# Patient Record
Sex: FEMALE | Race: WHITE
[De-identification: names, ages, dates, MRNs, and addresses within clinical notes are randomized per-mention and may not be internally consistent; named-entity substitution may affect disease eponyms.]

---

## 2018-01-10 ENCOUNTER — HOSPITAL ENCOUNTER (EMERGENCY)
Dept: HOSPITAL 89 - ER | Age: 48
Discharge: HOME | End: 2018-01-10
Payer: COMMERCIAL

## 2018-01-10 VITALS — BODY MASS INDEX: 38.21 KG/M2 | WEIGHT: 258 LBS | HEIGHT: 69 IN

## 2018-01-10 VITALS — DIASTOLIC BLOOD PRESSURE: 91 MMHG | SYSTOLIC BLOOD PRESSURE: 128 MMHG

## 2018-01-10 DIAGNOSIS — G89.29: Primary | ICD-10-CM

## 2018-01-10 LAB
INR PPP: 1.03
PLATELET COUNT, AUTOMATED: 201 K/UL (ref 150–450)

## 2018-01-10 PROCEDURE — 82435 ASSAY OF BLOOD CHLORIDE: CPT

## 2018-01-10 PROCEDURE — 96374 THER/PROPH/DIAG INJ IV PUSH: CPT

## 2018-01-10 PROCEDURE — 85730 THROMBOPLASTIN TIME PARTIAL: CPT

## 2018-01-10 PROCEDURE — 85025 COMPLETE CBC W/AUTO DIFF WBC: CPT

## 2018-01-10 PROCEDURE — 74018 RADEX ABDOMEN 1 VIEW: CPT

## 2018-01-10 PROCEDURE — 82040 ASSAY OF SERUM ALBUMIN: CPT

## 2018-01-10 PROCEDURE — 84450 TRANSFERASE (AST) (SGOT): CPT

## 2018-01-10 PROCEDURE — 84520 ASSAY OF UREA NITROGEN: CPT

## 2018-01-10 PROCEDURE — 74176 CT ABD & PELVIS W/O CONTRAST: CPT

## 2018-01-10 PROCEDURE — 82310 ASSAY OF CALCIUM: CPT

## 2018-01-10 PROCEDURE — 82565 ASSAY OF CREATININE: CPT

## 2018-01-10 PROCEDURE — 82247 BILIRUBIN TOTAL: CPT

## 2018-01-10 PROCEDURE — 81001 URINALYSIS AUTO W/SCOPE: CPT

## 2018-01-10 PROCEDURE — 84295 ASSAY OF SERUM SODIUM: CPT

## 2018-01-10 PROCEDURE — 84075 ASSAY ALKALINE PHOSPHATASE: CPT

## 2018-01-10 PROCEDURE — 82947 ASSAY GLUCOSE BLOOD QUANT: CPT

## 2018-01-10 PROCEDURE — 85610 PROTHROMBIN TIME: CPT

## 2018-01-10 PROCEDURE — 84132 ASSAY OF SERUM POTASSIUM: CPT

## 2018-01-10 PROCEDURE — 80320 DRUG SCREEN QUANTALCOHOLS: CPT

## 2018-01-10 PROCEDURE — 84155 ASSAY OF PROTEIN SERUM: CPT

## 2018-01-10 PROCEDURE — 99283 EMERGENCY DEPT VISIT LOW MDM: CPT

## 2018-01-10 PROCEDURE — 83690 ASSAY OF LIPASE: CPT

## 2018-01-10 PROCEDURE — 84460 ALANINE AMINO (ALT) (SGPT): CPT

## 2018-01-10 PROCEDURE — 82374 ASSAY BLOOD CARBON DIOXIDE: CPT

## 2018-01-10 NOTE — RADIOLOGY IMAGING REPORT
FACILITY: [facility] 

 

PATIENT NAME: Ana Maria Cole

: 1970

MR: [id number]

V: [id number]

EXAM DATE: 

ORDERING PHYSICIAN: MARIZA COTO

TECHNOLOGIST: 

 

Location: [facility]

Patient: Ana Maria Cole

: 1970

MRN: [id number]

Visit/Account:[id number]

Date of Sevice:  1/10/2018

 

ACCESSION #: [id number]

 

EXAMINATION: KUB 1/10/2018 9:16 AM

 

HISTORY: Abdominal pain. Constipation.

 

COMPARISON: None

 

FINDINGS:   Bowel gas pattern is unremarkable, without significant distention.  No substantial fecal 
retention. No visible calculus.  Soft tissue contours are unremarkable. Previous lumbosacral fusion.

 

IMPRESSION:

Nonspecific bowel gas pattern. No significant fecal retention.

 

Report Dictated By: Dorian Miller MD at 1/10/2018 9:37 AM

 

Report E-Signed By: Dorian Miller MD  at 1/10/2018 9:38 AM

 

WSN:[id number]

## 2018-01-10 NOTE — ER REPORT
History and Physical


Time Seen By MD:  08:45


Hx. of Stated Complaint:  


CONSTIPATION X 3 WEEKS


HPI/ROS


CHIEF COMPLAINT: Chronic constipation abdominal discomfort





HISTORY OF PRESENT ILLNESS: Patient is a 47-year-old female multiple surgical 

history including abdominal back surgery comes in with chronic constipation is 

been seen by primary care given numerous laxatives evidently she is on 

significant amounts of opioids for the last 10 years patient states that she's 

had some loose bowel movements last couple days but nothing regular and she's 

having abdominal discomfort. Patient has had nausea without vomiting denies any 

additional complaints at this time has no fever chills or sweats





REVIEW OF SYSTEMS:


Respiratory: No cough, no dyspnea.


Cardiovascular: No chest pain, no palpitations.


Gastrointestinal: Abdominal pain constipation nausea without vomiting


Musculoskeletal: Chronic back pain back pain.


Remainder of the 14 system rev:  Yes


Allergies:  


Coded Allergies:  


     iodine (Verified  Allergy, Unknown, 11/9/15)


     codeine (Verified  Adverse Reaction, Intermediate, NAUSEA AND VOMITING, 11/

9/15)


Uncoded Allergies:  


     CONTRAST (Allergy, Intermediate, HIVES, 1/10/18)


Home Meds


Active Scripts


Oxycodone Hcl/Acetaminophen (PERCOCET 5-325 MG TABLET) 1 Each Tablet, 1 EACH PO 

Q4-6H Y for PAIN, #15 TAB


   Prov:URSULA BLANC [facility]         11/9/15


Promethazine Hcl (PROMETHAZINE HCL) 25 Mg Tablet, 25 MG PO Q8H Y for NAUSEA/

VOMITING, #12 TAB


   Prov:URSULA BLANC [facility]         11/9/15


Trazodone Hcl (TRAZODONE HCL) 50 Mg Tablet, 50 MG PO BID, #60


   Prov:MARK NORWOOD MD         11/14/14


Reported Medications


Gabapentin (Neurontin) 600 Mg Tablet, 600 MG PO TID, 0 Refills


   7/21/11


Morphine Sulfate (Brenda) 30 Mg Cap.sr.pel, 90 MG PO QAM, 0 Refills


   7/21/11


Levothyroxine Sodium (Levothyroxine Sodium) 50 Mcg Tablet, 50 MCG PO QAM, 0 

Refills


   7/21/11


Metoprolol Tartrate (Lopressor) 50 Mg Tablet, 50 MG PO BID, 0 Refills


   7/21/11


Clonazepam (Klonopin) 1 Mg Tablet, 1 MG PO DAILY, 0 Refills


   7/21/11


Baclofen (Lioresal) 10 Mg Tab, 20 MG PO QID, 0 Refills


   7/21/11


Oxycodone Hcl/Acetaminophen (Percocet 10/325 Mg Tablet) 10 Mg/325 Mg Tab, 1 TAB 

PO PRN, 0 Refills


   7/21/11


Discontinued Reported Medications


Escitalopram Oxalate (LEXAPRO) 20 Mg Tablet, 20 MG PO QDAY


   11/9/15


Amlodipine Besylate (Norvasc) 2.5 Mg Tablet, 2.5 MG PO QHS, 0 Refills


   7/21/11


Discontinued Scripts


Prednisone (PREDNISONE) 20 Mg Tablet, 20 MG PO BID, #10 TAB


   Prov:URSULA BLANC FNP         11/9/15


Reviewed Nurses Notes:  Yes


Old Medical Records Reviewed:  Yes


Hx Smoking:  Yes


Smoking Status:  Current: Every Day Smoker


Hx Substance Use Disorder:  No


Hx Alcohol Use:  No


Constitutional





Vital Sign - Last 24 Hours








 1/10/18 1/10/18





 08:39 09:41


 


Temp 98.3 


 


Pulse 95 


 


Resp 16 


 


B/P (MAP) 128/91 


 


Pulse Ox 93 


 


O2 Delivery Room Air 


 


O2 Flow Rate  2.0








Physical Exam


  General Appearance: [The patient is alert, has no immediate need for airway 

protection and no current signs of toxicity.]  [ ]


Eyes: Pupils equal and round no injection.


Respiratory: Chest is non tender, lungs are clear to auscultation.


Cardiac: regular rate and rhythm [ ]


Gastrointestinal: Hyperactive bowel sounds tender to palpation in all quadrants 

no rebound guarding or masses


Musculoskeletal:  Neck: Neck is supple and non tender.


   Extremities have full range of motion and are non tender.


Skin: No rashes or lesions.


[ ]


DIFFERENTIAL DIAGNOSIS: After history and physical exam differential diagnosis 

was considered for chronic constipation secondary to opioid use small bowel 

obstruction colitis





Medical Decision Making


Data Points


Result Diagram:  


1/10/18 0902                                                                   

             1/10/18 0902





Laboratory





Hematology








Test


  1/10/18


09:02 1/10/18


09:29


 


Red Blood Count


  5.56 M/uL


(4.17-5.56) 


 


 


Mean Corpuscular Volume


  88.5 fL


(80.0-96.0) 


 


 


Mean Corpuscular Hemoglobin


  31.2 pg


(26.0-33.0) 


 


 


Mean Corpuscular Hemoglobin


Concent 35.2 g/dL


(32.0-36.0) 


 


 


Red Cell Distribution Width


  13.7 %


(11.5-14.5) 


 


 


Mean Platelet Volume


  8.8 fL


(7.2-11.1) 


 


 


Neutrophils (%) (Auto)


  46.9 %


(39.4-72.5) 


 


 


Lymphocytes (%) (Auto)


  44.7 %


(17.6-49.6) 


 


 


Monocytes (%) (Auto)


  6.1 %


(4.1-12.4) 


 


 


Eosinophils (%) (Auto)


  1.7 %


(0.4-6.7) 


 


 


Basophils (%) (Auto)


  0.6 %


(0.3-1.4) 


 


 


Nucleated RBC Relative Count


(auto) 0.1 /100WBC 


  


 


 


Neutrophils # (Auto)


  4.4 K/uL


(2.0-7.4) 


 


 


Lymphocytes # (Auto)


  4.2 K/uL


(1.3-3.6) 


 


 


Monocytes # (Auto)


  0.6 K/uL


(0.3-1.0) 


 


 


Eosinophils # (Auto)


  0.2 K/uL


(0.0-0.5) 


 


 


Basophils # (Auto)


  0.1 K/uL


(0.0-0.1) 


 


 


Nucleated RBC Absolute Count


(auto) 0.01 K/uL 


  


 


 


Prothrombin Time


  13.5 seconds


(12.0-14.4) 


 


 


Prothromb Time International


Ratio 1.03 


  


 


 


Activated Partial


Thromboplast Time 33 seconds


(23-35) 


 


 


Sodium Level


  137 mmol/L


(137-145) 


 


 


Potassium Level


  3.9 mmol/L


(3.5-5.0) 


 


 


Chloride Level


  102 mmol/L


() 


 


 


Carbon Dioxide Level


  21 mmol/L


(22-31) 


 


 


Blood Urea Nitrogen


  15 mg/dl


(7-18) 


 


 


Creatinine


  0.80 mg/dl


(0.52-1.04) 


 


 


Glomerular Filtration Rate


Calc > 60.0 


  


 


 


Random Glucose


  106 mg/dl


() 


 


 


Calcium Level


  9.4 mg/dl


(8.4-10.2) 


 


 


Total Bilirubin


  0.6 mg/dl


(0.2-1.3) 


 


 


Aspartate Amino Transf


(AST/SGOT) 34 U/L (0-35) 


  


 


 


Alanine Aminotransferase


(ALT/SGPT) 49 U/L (0-56) 


  


 


 


Alkaline Phosphatase 67 U/L (0-126)  


 


Total Protein


  8.3 gm/dl


(6.3-8.2) 


 


 


Albumin


  4.6 g/dl


(3.5-5.0) 


 


 


Lipase


  61 U/L


() 


 


 


Serum Alcohol < 10 mg/dl  


 


Urine Color  Yellow 


 


Urine Clarity  Clear 


 


Urine pH


  


  5.0 pH


(4.8-9.5)


 


Urine Specific Gravity  1.006 


 


Urine Protein


  


  Negative mg/dL


(NEGATIVE)


 


Urine Glucose (UA)


  


  Negative mg/dL


(NEGATIVE)


 


Urine Ketones


  


  Negative mg/dL


(NEGATIVE)


 


Urine Blood


  


  Negative


(NEGATIVE)


 


Urine Nitrite


  


  Negative


(NEGATIVE)


 


Urine Bilirubin


  


  Negative


(NEGATIVE)


 


Urine Urobilinogen


  


  Negative mg/dL


(0.2-1.9)


 


Urine Leukocyte Esterase


  


  Negative


(NEGATIVE)


 


Urine RBC


  


  <1 /HPF


(0-2/HPF)


 


Urine WBC


  


  1 /HPF


(0-5/HPF)


 


Urine Squamous Epithelial


Cells 


  Many /LPF


(</=FEW)


 


Urine Bacteria


  


  Few /HPF


(NONE-FEW)


 


Urine Mucus


  


  None /HPF


(NONE-FEW)








Chemistry








Test


  1/10/18


09:02 1/10/18


09:29


 


White Blood Count


  9.3 k/uL


(4.5-11.0) 


 


 


Red Blood Count


  5.56 M/uL


(4.17-5.56) 


 


 


Hemoglobin


  17.3 g/dL


(12.0-16.0) 


 


 


Hematocrit


  49.2 %


(34.0-47.0) 


 


 


Mean Corpuscular Volume


  88.5 fL


(80.0-96.0) 


 


 


Mean Corpuscular Hemoglobin


  31.2 pg


(26.0-33.0) 


 


 


Mean Corpuscular Hemoglobin


Concent 35.2 g/dL


(32.0-36.0) 


 


 


Red Cell Distribution Width


  13.7 %


(11.5-14.5) 


 


 


Platelet Count


  201 K/uL


(150-450) 


 


 


Mean Platelet Volume


  8.8 fL


(7.2-11.1) 


 


 


Neutrophils (%) (Auto)


  46.9 %


(39.4-72.5) 


 


 


Lymphocytes (%) (Auto)


  44.7 %


(17.6-49.6) 


 


 


Monocytes (%) (Auto)


  6.1 %


(4.1-12.4) 


 


 


Eosinophils (%) (Auto)


  1.7 %


(0.4-6.7) 


 


 


Basophils (%) (Auto)


  0.6 %


(0.3-1.4) 


 


 


Nucleated RBC Relative Count


(auto) 0.1 /100WBC 


  


 


 


Neutrophils # (Auto)


  4.4 K/uL


(2.0-7.4) 


 


 


Lymphocytes # (Auto)


  4.2 K/uL


(1.3-3.6) 


 


 


Monocytes # (Auto)


  0.6 K/uL


(0.3-1.0) 


 


 


Eosinophils # (Auto)


  0.2 K/uL


(0.0-0.5) 


 


 


Basophils # (Auto)


  0.1 K/uL


(0.0-0.1) 


 


 


Nucleated RBC Absolute Count


(auto) 0.01 K/uL 


  


 


 


Prothrombin Time


  13.5 seconds


(12.0-14.4) 


 


 


Prothromb Time International


Ratio 1.03 


  


 


 


Activated Partial


Thromboplast Time 33 seconds


(23-35) 


 


 


Glomerular Filtration Rate


Calc > 60.0 


  


 


 


Calcium Level


  9.4 mg/dl


(8.4-10.2) 


 


 


Total Bilirubin


  0.6 mg/dl


(0.2-1.3) 


 


 


Aspartate Amino Transf


(AST/SGOT) 34 U/L (0-35) 


  


 


 


Alanine Aminotransferase


(ALT/SGPT) 49 U/L (0-56) 


  


 


 


Alkaline Phosphatase 67 U/L (0-126)  


 


Total Protein


  8.3 gm/dl


(6.3-8.2) 


 


 


Albumin


  4.6 g/dl


(3.5-5.0) 


 


 


Lipase


  61 U/L


() 


 


 


Serum Alcohol < 10 mg/dl  


 


Urine Color  Yellow 


 


Urine Clarity  Clear 


 


Urine pH


  


  5.0 pH


(4.8-9.5)


 


Urine Specific Gravity  1.006 


 


Urine Protein


  


  Negative mg/dL


(NEGATIVE)


 


Urine Glucose (UA)


  


  Negative mg/dL


(NEGATIVE)


 


Urine Ketones


  


  Negative mg/dL


(NEGATIVE)


 


Urine Blood


  


  Negative


(NEGATIVE)


 


Urine Nitrite


  


  Negative


(NEGATIVE)


 


Urine Bilirubin


  


  Negative


(NEGATIVE)


 


Urine Urobilinogen


  


  Negative mg/dL


(0.2-1.9)


 


Urine Leukocyte Esterase


  


  Negative


(NEGATIVE)


 


Urine RBC


  


  <1 /HPF


(0-2/HPF)


 


Urine WBC


  


  1 /HPF


(0-5/HPF)


 


Urine Squamous Epithelial


Cells 


  Many /LPF


(</=FEW)


 


Urine Bacteria


  


  Few /HPF


(NONE-FEW)


 


Urine Mucus


  


  None /HPF


(NONE-FEW)








Coagulation








Test


  1/10/18


09:02


 


Prothrombin Time 13.5 seconds 


 


Prothromb Time International


Ratio 1.03 


 


 


Activated Partial


Thromboplast Time 33 seconds 


 








Toxicology








Test


  1/10/18


09:02


 


Serum Alcohol < 10 mg/dl 








Urinalysis








Test


  1/10/18


09:29


 


Urine Color Yellow 


 


Urine Clarity Clear 


 


Urine pH


  5.0 pH


(4.8-9.5)


 


Urine Specific Gravity 1.006 


 


Urine Protein


  Negative mg/dL


(NEGATIVE)


 


Urine Glucose (UA)


  Negative mg/dL


(NEGATIVE)


 


Urine Ketones


  Negative mg/dL


(NEGATIVE)


 


Urine Blood


  Negative


(NEGATIVE)


 


Urine Nitrite


  Negative


(NEGATIVE)


 


Urine Bilirubin


  Negative


(NEGATIVE)


 


Urine Urobilinogen


  Negative mg/dL


(0.2-1.9)


 


Urine Leukocyte Esterase


  Negative


(NEGATIVE)


 


Urine RBC


  <1 /HPF


(0-2/HPF)


 


Urine WBC


  1 /HPF


(0-5/HPF)


 


Urine Squamous Epithelial


Cells Many /LPF


(</=FEW)


 


Urine Bacteria


  Few /HPF


(NONE-FEW)


 


Urine Mucus


  None /HPF


(NONE-FEW)











ED Course/Re-evaluation


ED Course


ED clinical course 47-year-old female presented emergency Department today with 

complaint of constipation she is on chronic opioid significant doses on a daily 

basis for the past 10 years x-ray KUB as well as CAT scan performed showed no 

obvious obstruction SBO no sign of constipation patient electronic to otherwise 

unremarkable patient will be discharge diagnosis opioid-induced slow motility 

and constipation we'll start her on Moban taken primary care follow-up


Decision to Disposition Date:  [place] 10, 2018


Decision to Disposition Time:  10:25





Depart


Departure


Latest Vital Signs





Vital Signs








  Date Time  Temp Pulse Resp B/P (MAP) Pulse Ox O2 Delivery O2 Flow Rate FiO2


 


1/10/18 09:41       2.0 


 


1/10/18 08:39 98.3 95 16 128/91 93 Room Air  








Impression:  


 Primary Impression:  


 Chronic pain


Condition:  Improved


Disposition:  HOME OR SELF-CARE


Referrals:  


OMEGA XIAO DO


5 Days


Patient Instructions:  Abdominal Pain (ED)











MARIZA COTO MD Dell 10, 2018 09:21

## 2018-01-10 NOTE — RADIOLOGY IMAGING REPORT
FACILITY: [facility] 

 

PATIENT NAME: Ana Maria Cole

: 1970

MR: [id number]

V: [id number]

EXAM DATE: 

ORDERING PHYSICIAN: MARIZA COTO

TECHNOLOGIST: 

 

Location: [facility]

Patient: Ana Marai Cole

: 1970

MRN: [id number]

Visit/Account:[id number]

Date of Sevice:  1/10/2018

 

ACCESSION #: [id number]

 

CT abdomen and pelvis without contrast

 

Indication: Abdominal pain. Possible bowel obstruction.

 

Comparison: Plain film exam from earlier today was reviewed.

 

Technique: Axial CT images are obtained through the abdomen and pelvis. Reformatted coronal and sagit
clarisse images were reviewed. IV contrast was not administered.

 

One of the following dose optimization techniques was utilized in the performance of this exam: Autom
ated exposure control; adjustment of the mA and/or kV according to the patient's size; or use of an i
terative  reconstruction technique.  Specific details can be referenced in the facility's radiology C
T exam operational policy.

 

Findings:

Lower lung fields: Minimal atelectasis/peripheral scarring seen in the right lower lobe. Lung bases o
therwise clear.

 

Evaluation of the solid organs of the abdomen is limited without IV contrast.

 

Liver: No focal parenchymal abnormality of the liver.

Biliary: Gallbladder is partially contracted. No biliary dilatation.

Pancreas: Normal appearance.

Spleen: Normal appearance.s

Adrenal glands: Unremarkable.

Kidneys / retroperitoneum: No evidence of nephrolithiasis or hydronephrosis

 

 

Bowel / peritoneum / mesenteries: The colon is unremarkable and is partially stool and gas filled. No
 wall thickening or pericolonic inflammation. Appendix not clearly seen. No CT evidence of appendicit
is. No focally dilated small bowel loops. No evidence to suggest bowel obstruction. No free air. No f
ree pelvic fluid.

 

 

Lymph node assessment: No pathologic adenopathy identified.

 

Pelvic  structures: There has been previous hysterectomy. No free fluid. Cyst and/or dominant folli
dolores seen within the left ovary measuring 1.8 cm in size. This may be physiologic in a premenopausal f
emale. Clinical correlation necessary.

 

 

Vessels: No significant atherosclerotic calcifications seen throughout a nonaneurysmal abdominal aort
a and branches.

 

Musculoskeletal / Body wall: There has been prior anterior and posterior fusion procedure of the low 
lumbar spine extending from L4 to S1. Laminectomy defects are also seen. No compression fracture.

 

 

 

IMPRESSION:

1. No acute inflammatory process within the abdomen and the pelvis. No evidence of small bowel obstru
ction.

2. Cyst or follicle involving the left ovary measuring 1.8 cm.

3. Postoperative changes involving the low lumbar spine.

 

 

Report Dictated By: Giancarlo Lozano at 1/10/2018 9:56 AM

 

Report E-Signed By: Giancralo Lozano  at 1/10/2018 10:14 AM

 

WSN:[id number]

## 2018-03-09 ENCOUNTER — HOSPITAL ENCOUNTER (EMERGENCY)
Dept: HOSPITAL 89 - ER | Age: 48
Discharge: HOME | End: 2018-03-09
Payer: COMMERCIAL

## 2018-03-09 VITALS — DIASTOLIC BLOOD PRESSURE: 62 MMHG | SYSTOLIC BLOOD PRESSURE: 105 MMHG

## 2018-03-09 DIAGNOSIS — M25.562: Primary | ICD-10-CM

## 2018-03-09 PROCEDURE — 99283 EMERGENCY DEPT VISIT LOW MDM: CPT

## 2018-03-09 PROCEDURE — 73562 X-RAY EXAM OF KNEE 3: CPT
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
## 2018-10-15 NOTE — RADIOLOGY IMAGING REPORT
FACILITY: [facility] 

 

PATIENT NAME: Ana Maria Cole

: 1970

MR: [id number]

V: [id number]

EXAM DATE: 

ORDERING PHYSICIAN: MARIZA COTO

TECHNOLOGIST: 

 

Location: [facility]

Patient: Ana Maria Cole

: 1970

MRN: [id number]

Visit/Account:[id number]

Date of Sevice:  3/09/2018

 

ACCESSION #: [id number]

 

Exam type: KNEE 3 VIEW LEFT

 

History: Left knee pain

 

Comparison: None.

 

Findings:

 

There is mild narrowing of the medial compartment of the left knee with very mild marginal spurring. 
 There also appears to be spurring at the tibial plateau along the anterior medial surface.  Mild to 
moderate joint changes are also noted patellofemoral joint.  There suggestion of a small amount of fl
uid in the suprapatellar bursa.  No evidence of acute fracture or dislocation.

 

IMPRESSION:

 

1.  Mild degenerative changes involving the medial compartment of the left knee and mild to moderate 
joint changes involving the patellofemoral compartment although no evidence of acute fracture disloca
tion

 

Suggestion of small effusion in the suprapatellar bursa

 

Report Dictated By: Teodora Bravo MD at 3/9/2018 11:01 AM

 

Report E-Signed By: Teodora Bravo MD  at 3/9/2018 11:03 AM

 

WSN:[id number] no

## 2018-12-15 ENCOUNTER — HOSPITAL ENCOUNTER (EMERGENCY)
Dept: HOSPITAL 89 - ER | Age: 48
Discharge: HOME | End: 2018-12-15
Payer: COMMERCIAL

## 2018-12-15 VITALS — DIASTOLIC BLOOD PRESSURE: 119 MMHG | SYSTOLIC BLOOD PRESSURE: 184 MMHG

## 2018-12-15 DIAGNOSIS — F11.23: Primary | ICD-10-CM

## 2018-12-15 LAB — PLATELET COUNT, AUTOMATED: 211 K/UL (ref 150–450)

## 2018-12-15 PROCEDURE — 84075 ASSAY ALKALINE PHOSPHATASE: CPT

## 2018-12-15 PROCEDURE — 80320 DRUG SCREEN QUANTALCOHOLS: CPT

## 2018-12-15 PROCEDURE — 80329 ANALGESICS NON-OPIOID 1 OR 2: CPT

## 2018-12-15 PROCEDURE — 84460 ALANINE AMINO (ALT) (SGPT): CPT

## 2018-12-15 PROCEDURE — 82310 ASSAY OF CALCIUM: CPT

## 2018-12-15 PROCEDURE — 80305 DRUG TEST PRSMV DIR OPT OBS: CPT

## 2018-12-15 PROCEDURE — 85025 COMPLETE CBC W/AUTO DIFF WBC: CPT

## 2018-12-15 PROCEDURE — 83735 ASSAY OF MAGNESIUM: CPT

## 2018-12-15 PROCEDURE — 36415 COLL VENOUS BLD VENIPUNCTURE: CPT

## 2018-12-15 PROCEDURE — 81001 URINALYSIS AUTO W/SCOPE: CPT

## 2018-12-15 PROCEDURE — 84520 ASSAY OF UREA NITROGEN: CPT

## 2018-12-15 PROCEDURE — 84295 ASSAY OF SERUM SODIUM: CPT

## 2018-12-15 PROCEDURE — 84155 ASSAY OF PROTEIN SERUM: CPT

## 2018-12-15 PROCEDURE — 82435 ASSAY OF BLOOD CHLORIDE: CPT

## 2018-12-15 PROCEDURE — 82247 BILIRUBIN TOTAL: CPT

## 2018-12-15 PROCEDURE — 84443 ASSAY THYROID STIM HORMONE: CPT

## 2018-12-15 PROCEDURE — 84450 TRANSFERASE (AST) (SGOT): CPT

## 2018-12-15 PROCEDURE — 82374 ASSAY BLOOD CARBON DIOXIDE: CPT

## 2018-12-15 PROCEDURE — 99283 EMERGENCY DEPT VISIT LOW MDM: CPT

## 2018-12-15 PROCEDURE — 82947 ASSAY GLUCOSE BLOOD QUANT: CPT

## 2018-12-15 PROCEDURE — 82040 ASSAY OF SERUM ALBUMIN: CPT

## 2018-12-15 PROCEDURE — 81025 URINE PREGNANCY TEST: CPT

## 2018-12-15 PROCEDURE — 82565 ASSAY OF CREATININE: CPT

## 2018-12-15 PROCEDURE — 84132 ASSAY OF SERUM POTASSIUM: CPT

## 2018-12-15 NOTE — ER REPORT
History and Physical


Time Seen By MD:  11:03


Hx. of Stated Complaint:  


PATIENT REPORTS THAT SHE WANTS TO DETOX FROM MORPHINE AND PERCOCET


HPI/ROS


CHIEF COMPLAINT: Detox from opiates





HISTORY OF PRESENT ILLNESS: This is a 48-year-old female who presents to the 


emergency department lying to detox from her opiates. The patient states that 


she has spondylolisthesis and has had back surgeries and most recently had a 


left total knee replacement. Patient states that she's been on narcotic pain 


medications for the last 15 years, she states that she is tired of feeling 


foggy, sleepy and "just out of it". Patient is requesting detox from her 


prescription opiate medications. The last dose taken was yesterday morning, she 


typically takes her medications on a daily basis. She states that she is feeling


mildly anxious at this time. No fevers or chills. Mild intermittent nausea, no 


vomiting. No diarrhea. No headaches. No rashes.





REVIEW OF SYSTEMS:


Constitutional: No fever, no chills.


Eyes: No discharge.


ENT: No sore throat. 


Cardiovascular: No chest pain, no palpitations.


Respiratory: No cough, no shortness of breath.


Gastrointestinal: As above.


Genitourinary: No hematuria.


Musculoskeletal: No back pain.


Skin: No rashes.


Neurological: No headache.


Psychological: As above.


Allergies:  


Coded Allergies:  


     iodine (Verified  Allergy, Unknown, 3/9/18)


     codeine (Verified  Adverse Reaction, Intermediate, NAUSEA AND VOMITING, 


3/9/18)


Uncoded Allergies:  


     CONTRAST (Allergy, Intermediate, HIVES, 1/10/18)


Home Meds


Active Scripts


Clonazepam (KLONOPIN) 1 Mg Tablet, 1 MG PO BID, #20 TAB


   Prov:PAIGE BRONSON [facility]         12/15/18


Clonidine Hcl (CLONIDINE HCL) 0.1 Mg Tablet, 0.1 MG PO BID, #20 TAB 0 Refills


   Prov:PAIGE BRONSON [facility]         12/15/18


Promethazine Hcl (PROMETHAZINE HCL) 25 Mg Tablet, 25 MG PO Q8H PRN for 


NAUSEA/VOMITING, #12 TAB


   Prov:URSULA BLANC [facility]         11/9/15


Trazodone Hcl (TRAZODONE HCL) 50 Mg Tablet, 50 MG PO BID, #60


   Prov:MARK NORWOOD MD         11/14/14


Reported Medications


Omeprazole Magnesium (PRILOSEC OTC) 20 Mg Tablet.dr, 2 TAB PO QDAY, TAB


   12/15/18


Escitalopram Oxalate (LEXAPRO) 20 Mg Tablet, 20 MG PO QDAY, TAB


   12/15/18


Meloxicam (MELOXICAM) 7.5 Mg Tablet, 5 MG PO QDAY


   12/15/18


Gabapentin (Neurontin) 600 Mg Tablet, 600 MG PO TID, 0 Refills


   7/21/11


Morphine Sulfate (Brenda) 30 Mg Cap.sr.pel, 90 MG PO QAM, 0 Refills


   7/21/11


Levothyroxine Sodium (Levothyroxine Sodium) 50 Mcg Tablet, 75 MCG PO QAM, 0 


Refills


   7/21/11


Metoprolol Tartrate (Lopressor) 50 Mg Tablet, 50 MG PO BID, 0 Refills


   7/21/11


Clonazepam (Klonopin) 1 Mg Tablet, 1 MG PO DAILY, 0 Refills


   7/21/11


Baclofen (Lioresal) 10 Mg Tab, 20 MG PO QID, 0 Refills


   7/21/11


Oxycodone Hcl/Acetaminophen (Percocet 10/325 Mg Tablet) 10 Mg/325 Mg Tab, 1 TAB 


PO PRN, 0 Refills


   7/21/11


Past Medical/Surgical History


The patient has a past medical and surgical history of hypertension, acid 


reflux, chronic back pain, spinal listhesis, arthritis, hypothyroidism, anxiety,


panic attacks, depression, right knee surgery. Tonsillectomy.


Reviewed Nurses Notes:  Yes


Hx Smoking:  Yes


Smoking Status:  Current: Every Day Smoker


Hx Substance Use Disorder:  No


Hx Alcohol Use:  No


Constitutional





Vital Sign - Last 24 Hours








 12/15/18





 11:00


 


Temp 97.9


 


Pulse 81


 


Resp 20


 


B/P (MAP) 184/119


 


Pulse Ox 95


 


O2 Delivery Room Air








Physical Exam


   General Appearance: The patient is alert, has no immediate need for airway 


protection and no signs of toxicity. 


Eyes: Pupils equal and round no pallor or injection.


ENT, Mouth: Mucous membranes are dry, geographic tongue.


Respiratory: There are no retractions, lungs are clear to auscultation.


Cardiovascular: Regular rate and rhythm, no murmurs, clicks or rubs.


Gastrointestinal:  Abdomen is soft and non tender, no masses, bowel sounds 


normal.


Neurological: Alert and oriented 4. Moving all extremities. Following all 


commands. No focal neuro deficits.


Skin: Warm and dry, no rashes.


Musculoskeletal:  Neck is supple non tender.


      Extremities are nontender, nonswollen and have full range of motion.





DIFFERENTIAL DIAGNOSIS: After history and physical exam differential diagnosis 


was considered for seizure, hypertension and anxiety.





Medical Decision Making


Data Points


Result Diagram:  


12/15/18 1132                                                                   


            12/15/18 1132





Laboratory





Hematology








Test


 12/15/18


11:00 12/15/18


11:32


 


Urine Color Straw  


 


Urine Clarity Clear  


 


Urine pH


 5.0 pH


(4.8-9.5) 





 


Urine Specific Gravity 1.005  


 


Urine Protein


 Negative mg/dL


(NEGATIVE) 





 


Urine Glucose (UA)


 Negative mg/dL


(NEGATIVE) 





 


Urine Ketones


 Negative mg/dL


(NEGATIVE) 





 


Urine Blood


 Negative


(NEGATIVE) 





 


Urine Nitrite


 Negative


(NEGATIVE) 





 


Urine Bilirubin


 Negative


(NEGATIVE) 





 


Urine Urobilinogen


 Negative mg/dL


(0.2-1.9) 





 


Urine Leukocyte Esterase


 Negative


(NEGATIVE) 





 


Urine RBC


 <1 /HPF


(0-2/HPF) 





 


Urine WBC


 1 /HPF


(0-5/HPF) 





 


Urine Squamous Epithelial


Cells Many /LPF


(</=FEW) 





 


Urine Bacteria


 Negative /HPF


(NONE-FEW) 





 


Urine Mucus


 None /HPF


(NONE-FEW) 





 


Urine HCG, Qualitative


 Negative


(NEGATIVE) 





 


Urine Opiates Screen Positive  


 


Urine Barbiturates Screen Negative  


 


Ur Tricyclic Antidepressants


Screen Negative 


 





 


Urine Phencyclidine Screen Negative  


 


Urine Amphetamines Screen Negative  


 


Urine Benzodiazepines Screen Negative  


 


Urine Cocaine Screen Negative  


 


Urine Cannabinoids Screen Negative  


 


Red Blood Count


 


 5.13 M/uL


(4.17-5.56)


 


Mean Corpuscular Volume


 


 86.1 fL


(80.0-96.0)


 


Mean Corpuscular Hemoglobin


 


 29.8 pg


(26.0-33.0)


 


Mean Corpuscular Hemoglobin


Concent 


 34.6 g/dL


(32.0-36.0)


 


Red Cell Distribution Width


 


 14.4 %


(11.5-14.5)


 


Mean Platelet Volume


 


 8.1 fL


(7.2-11.1)


 


Neutrophils (%) (Auto)


 


 73.5 %


(39.4-72.5)


 


Lymphocytes (%) (Auto)


 


 21.3 %


(17.6-49.6)


 


Monocytes (%) (Auto)


 


 3.5 %


(4.1-12.4)


 


Eosinophils (%) (Auto)


 


 0.2 %


(0.4-6.7)


 


Basophils (%) (Auto)


 


 1.5 %


(0.3-1.4)


 


Nucleated RBC Relative Count


(auto) 


 0.1 /100WBC 





 


Neutrophils # (Auto)


 


 5.8 K/uL


(2.0-7.4)


 


Lymphocytes # (Auto)


 


 1.7 K/uL


(1.3-3.6)


 


Monocytes # (Auto)


 


 0.3 K/uL


(0.3-1.0)


 


Eosinophils # (Auto)


 


 0.0 K/uL


(0.0-0.5)


 


Basophils # (Auto)


 


 0.1 K/uL


(0.0-0.1)


 


Nucleated RBC Absolute Count


(auto) 


 0.01 K/uL 





 


Sodium Level


 


 136 mmol/L


(137-145)


 


Potassium Level


 


 3.8 mmol/L


(3.5-5.0)


 


Chloride Level


 


 104 mmol/L


()


 


Carbon Dioxide Level


 


 21 mmol/L


(22-31)


 


Blood Urea Nitrogen


 


 11 mg/dl


(7-18)


 


Creatinine


 


 0.80 mg/dl


(0.52-1.04)


 


Glomerular Filtration Rate


Calc 


 > 60.0 





 


Random Glucose


 


 142 mg/dl


()


 


Calcium Level


 


 9.5 mg/dl


(8.4-10.2)


 


Magnesium Level


 


 1.9 mg/dl


(1.7-2.2)


 


Total Bilirubin


 


 0.4 mg/dl


(0.2-1.3)


 


Aspartate Amino Transf


(AST/SGOT) 


 23 U/L (0-35) 





 


Alanine Aminotransferase


(ALT/SGPT) 


 27 U/L (0-56) 





 


Alkaline Phosphatase  63 U/L (0-126) 


 


Total Protein


 


 7.9 g/dl


(6.3-8.2)


 


Albumin


 


 4.5 g/dl


(3.5-5.0)


 


Salicylates Level  < 10 mg/L 


 


Salicylate Last Dose Date  unk 


 


Acetaminophen Level  < 10 ug/ml 


 


Serum Alcohol  < 10 mg/dl 








Chemistry








Test


 12/15/18


11:00 12/15/18


11:32


 


Urine Color Straw  


 


Urine Clarity Clear  


 


Urine pH


 5.0 pH


(4.8-9.5) 





 


Urine Specific Gravity 1.005  


 


Urine Protein


 Negative mg/dL


(NEGATIVE) 





 


Urine Glucose (UA)


 Negative mg/dL


(NEGATIVE) 





 


Urine Ketones


 Negative mg/dL


(NEGATIVE) 





 


Urine Blood


 Negative


(NEGATIVE) 





 


Urine Nitrite


 Negative


(NEGATIVE) 





 


Urine Bilirubin


 Negative


(NEGATIVE) 





 


Urine Urobilinogen


 Negative mg/dL


(0.2-1.9) 





 


Urine Leukocyte Esterase


 Negative


(NEGATIVE) 





 


Urine RBC


 <1 /HPF


(0-2/HPF) 





 


Urine WBC


 1 /HPF


(0-5/HPF) 





 


Urine Squamous Epithelial


Cells Many /LPF


(</=FEW) 





 


Urine Bacteria


 Negative /HPF


(NONE-FEW) 





 


Urine Mucus


 None /HPF


(NONE-FEW) 





 


Urine HCG, Qualitative


 Negative


(NEGATIVE) 





 


Urine Opiates Screen Positive  


 


Urine Barbiturates Screen Negative  


 


Ur Tricyclic Antidepressants


Screen Negative 


 





 


Urine Phencyclidine Screen Negative  


 


Urine Amphetamines Screen Negative  


 


Urine Benzodiazepines Screen Negative  


 


Urine Cocaine Screen Negative  


 


Urine Cannabinoids Screen Negative  


 


White Blood Count


 


 7.9 k/uL


(4.5-11.0)


 


Red Blood Count


 


 5.13 M/uL


(4.17-5.56)


 


Hemoglobin


 


 15.3 g/dL


(12.0-16.0)


 


Hematocrit


 


 44.2 %


(34.0-47.0)


 


Mean Corpuscular Volume


 


 86.1 fL


(80.0-96.0)


 


Mean Corpuscular Hemoglobin


 


 29.8 pg


(26.0-33.0)


 


Mean Corpuscular Hemoglobin


Concent 


 34.6 g/dL


(32.0-36.0)


 


Red Cell Distribution Width


 


 14.4 %


(11.5-14.5)


 


Platelet Count


 


 211 K/uL


(150-450)


 


Mean Platelet Volume


 


 8.1 fL


(7.2-11.1)


 


Neutrophils (%) (Auto)


 


 73.5 %


(39.4-72.5)


 


Lymphocytes (%) (Auto)


 


 21.3 %


(17.6-49.6)


 


Monocytes (%) (Auto)


 


 3.5 %


(4.1-12.4)


 


Eosinophils (%) (Auto)


 


 0.2 %


(0.4-6.7)


 


Basophils (%) (Auto)


 


 1.5 %


(0.3-1.4)


 


Nucleated RBC Relative Count


(auto) 


 0.1 /100WBC 





 


Neutrophils # (Auto)


 


 5.8 K/uL


(2.0-7.4)


 


Lymphocytes # (Auto)


 


 1.7 K/uL


(1.3-3.6)


 


Monocytes # (Auto)


 


 0.3 K/uL


(0.3-1.0)


 


Eosinophils # (Auto)


 


 0.0 K/uL


(0.0-0.5)


 


Basophils # (Auto)


 


 0.1 K/uL


(0.0-0.1)


 


Nucleated RBC Absolute Count


(auto) 


 0.01 K/uL 





 


Glomerular Filtration Rate


Calc 


 > 60.0 





 


Calcium Level


 


 9.5 mg/dl


(8.4-10.2)


 


Magnesium Level


 


 1.9 mg/dl


(1.7-2.2)


 


Total Bilirubin


 


 0.4 mg/dl


(0.2-1.3)


 


Aspartate Amino Transf


(AST/SGOT) 


 23 U/L (0-35) 





 


Alanine Aminotransferase


(ALT/SGPT) 


 27 U/L (0-56) 





 


Alkaline Phosphatase  63 U/L (0-126) 


 


Total Protein


 


 7.9 g/dl


(6.3-8.2)


 


Albumin


 


 4.5 g/dl


(3.5-5.0)


 


Salicylates Level  < 10 mg/L 


 


Salicylate Last Dose Date  unk 


 


Acetaminophen Level  < 10 ug/ml 


 


Serum Alcohol  < 10 mg/dl 








Toxicology








Test


 12/15/18


11:00 12/15/18


11:32


 


Urine Opiates Screen Positive  


 


Urine Barbiturates Screen Negative  


 


Ur Tricyclic Antidepressants


Screen Negative 


 





 


Urine Phencyclidine Screen Negative  


 


Urine Amphetamines Screen Negative  


 


Urine Benzodiazepines Screen Negative  


 


Urine Cocaine Screen Negative  


 


Urine Cannabinoids Screen Negative  


 


Salicylates Level  < 10 mg/L 


 


Salicylate Last Dose Date  unk 


 


Acetaminophen Level  < 10 ug/ml 


 


Serum Alcohol  < 10 mg/dl 








Urinalysis








Test


 12/15/18


11:00


 


Urine Color Straw 


 


Urine Clarity Clear 


 


Urine pH


 5.0 pH


(4.8-9.5)


 


Urine Specific Gravity 1.005 


 


Urine Protein


 Negative mg/dL


(NEGATIVE)


 


Urine Glucose (UA)


 Negative mg/dL


(NEGATIVE)


 


Urine Ketones


 Negative mg/dL


(NEGATIVE)


 


Urine Blood


 Negative


(NEGATIVE)


 


Urine Nitrite


 Negative


(NEGATIVE)


 


Urine Bilirubin


 Negative


(NEGATIVE)


 


Urine Urobilinogen


 Negative mg/dL


(0.2-1.9)


 


Urine Leukocyte Esterase


 Negative


(NEGATIVE)


 


Urine RBC


 <1 /HPF


(0-2/HPF)


 


Urine WBC


 1 /HPF


(0-5/HPF)


 


Urine Squamous Epithelial


Cells Many /LPF


(</=FEW)


 


Urine Bacteria


 Negative /HPF


(NONE-FEW)


 


Urine Mucus


 None /HPF


(NONE-FEW)


 


Urine HCG, Qualitative


 Negative


(NEGATIVE)











ED Course/Re-evaluation


ED Course


The patient was admitted to room. History and physical were obtained. 


Differential diagnoses were considered. A CBC, CMP and psych panel were 


collected. UA was corrected. Lab studies unremarkable. Behavioral health tech 


did come down and speak with the patient, patient was provided information on 


inpatient treatment program for opiate addiction. The patient was given a 


prescription for clonidine and I did refill her clonazepam as well, patient will


follow-up with Dr. Hill saw her primary care provider this week and likely wean 


off of the narcotics in a controlled setting. The patient was also given one 


clonidine while in the emergency department, her blood pressure did improve, the


last diastolic was 88. Patient was encouraged return to ER for any other 


concerns or worsening symptoms. Patient was in agreement with plan of care and 


discharged.


Decision to Disposition Date:  Dec 15, 2018


Decision to Disposition Time:  12:29





Depart


Departure


Latest Vital Signs





Vital Signs








  Date Time  Temp Pulse Resp B/P (MAP) Pulse Ox O2 Delivery O2 Flow Rate FiO2


 


12/15/18 11:00 97.9 81 20 184/119 95 Room Air  








Impression:  


   Primary Impression:  


   Opiate withdrawal


Condition:  Improved


Disposition:  HOME OR SELF-CARE


Referrals:  


OMEGA XIAO DO (PCP)


5 Days


New Scripts


Clonazepam (KLONOPIN) 1 Mg Tablet


1 MG PO BID, #20 TAB


   Prov: PAIGE BRONSON [facility]         12/15/18 


Clonidine Hcl (CLONIDINE HCL) 0.1 Mg Tablet


0.1 MG PO BID, #20 TAB 0 Refills


   Prov: PAIGE BRONSON [facility]         12/15/18


Patient Instructions:  Narcotic Pain Management (ED)





Additional Instructions:  


No concerning findings with lab studies.


Please take the Clonidine 0.1mg tablet once a day, this can affect your blood 


pressure to be sure to make slow transitional changes while taking the 


medication.


Take the Klonipin as needed for additional anxiety.


Please follow up with Dr. Xiao this week for reevaluation and a more 


controlled transition off your medications.


Drink plenty of water.


Get plenty of rest.


Return to the ED for any other concerns or worsening symptoms.











PAIGE BRONSON [facility]      Dec 15, 2018 11:06

## 2018-12-17 ENCOUNTER — HOSPITAL ENCOUNTER (EMERGENCY)
Dept: HOSPITAL 89 - ER | Age: 48
Discharge: HOME | End: 2018-12-17
Payer: COMMERCIAL

## 2018-12-17 VITALS — SYSTOLIC BLOOD PRESSURE: 159 MMHG | DIASTOLIC BLOOD PRESSURE: 109 MMHG

## 2018-12-17 DIAGNOSIS — F11.23: ICD-10-CM

## 2018-12-17 DIAGNOSIS — I10: Primary | ICD-10-CM

## 2018-12-17 PROCEDURE — 99283 EMERGENCY DEPT VISIT LOW MDM: CPT

## 2018-12-17 NOTE — ER REPORT
History and Physical


Time Seen By MD:  11:16


Hx. of Stated Complaint:  


PATIENT REPORTS THAT HER PRIMARY CARE PROVIDER TOLD HER TO COME TO THE ER TO 


HAVE HER BLOOD PRESSURE LOWERED


HPI/ROS


CHIEF COMPLAINT: High blood pressure





HISTORY OF PRESENT ILLNESS: This is a 48-year-old female presents to the 


emergency department for concerned of her blood pressure. Patient was seen and 


evaluated in the emergency department 2 days ago, wanting to withdrawal from her


narcotics, she was placed on clonidine and instructed to taper off of her 


narcotics with her primary care provider. She states she has a wrist style blood


pressure machine at home and she's been getting blood pressures in the 200s, she


contacted her primary care provider, they instructed her to come to the ER for 


evaluation. Upon arrival the patient's blood pressure is 145/99, she does not 


have a headache at this time. She states she can feel when her blood pressure is


elevated as it does cause a headache. She denies nausea or vomiting. She does 


however have some achiness in her legs surrounding her recent total knee 


replacement. She denies fevers. No visual changes. No rashes. No chest pain or 


shortness of breath.





REVIEW OF SYSTEMS:


Constitutional: No fever, no chills.


Eyes: No discharge.


ENT: No sore throat. 


Cardiovascular: As above.


Respiratory: No cough, no shortness of breath.


Gastrointestinal: No abdominal pain, no vomiting.


Genitourinary: No hematuria.


Musculoskeletal: No back pain.


Skin: No rashes.


Neurological: As above.


Allergies:  


Coded Allergies:  


     iodine (Verified  Allergy, Unknown, 3/9/18)


     codeine (Verified  Adverse Reaction, Intermediate, NAUSEA AND VOMITING, 


3/9/18)


Uncoded Allergies:  


     CONTRAST (Allergy, Intermediate, HIVES, 1/10/18)


Home Meds


Active Scripts


Cyclobenzaprine Hcl (CYCLOBENZAPRINE HCL) 10 Mg Tablet, 5-10 MG PO TID PRN for 


MUSCLE SPASMS, #9 TAB 0 Refills


   [facility]         [facility]


Clonidine Hcl (CLONIDINE HCL) 0.1 Mg Tablet, 0.2 MG PO BID, #10 TAB 0 Refills


   Take two of the 0.1mg (0.2mg total) clonidine tablets in the morning


   and 0.1mg at night.


   [facility]         [facility]


Clonazepam (KLONOPIN) 1 Mg Tablet, 1 MG PO BID, #20 TAB


   Prov:PAIGE BRONSON [facility]         12/15/18


Clonidine Hcl (CLONIDINE HCL) 0.1 Mg Tablet, 0.1 MG PO BID, #20 TAB 0 Refills


   Prov:PAIGE BRONSON [facility]         12/15/18


Promethazine Hcl (PROMETHAZINE HCL) 25 Mg Tablet, 25 MG PO Q8H PRN for 


NAUSEA/VOMITING, #12 TAB


   Prov:JAYASHREETREVINE [facility]         11/9/15


Trazodone Hcl (TRAZODONE HCL) 50 Mg Tablet, 50 MG PO BID, #60


   Prov:MARK NORWOOD MD         11/14/14


Reported Medications


Omeprazole Magnesium (PRILOSEC OTC) 20 Mg Tablet.dr, 2 TAB PO QDAY, TAB


   12/15/18


Escitalopram Oxalate (LEXAPRO) 20 Mg Tablet, 20 MG PO QDAY, TAB


   12/15/18


Meloxicam (MELOXICAM) 7.5 Mg Tablet, 5 MG PO QDAY


   12/15/18


Gabapentin (Neurontin) 600 Mg Tablet, 600 MG PO TID, 0 Refills


   7/21/11


Morphine Sulfate (Brenda) 30 Mg Cap.sr.pel, 90 MG PO QAM, 0 Refills


   7/21/11


Levothyroxine Sodium (Levothyroxine Sodium) 50 Mcg Tablet, 75 MCG PO QAM, 0 


Refills


   7/21/11


Metoprolol Tartrate (Lopressor) 50 Mg Tablet, 50 MG PO BID, 0 Refills


   7/21/11


Clonazepam (Klonopin) 1 Mg Tablet, 1 MG PO DAILY, 0 Refills


   7/21/11


Baclofen (Lioresal) 10 Mg Tab, 20 MG PO QID, 0 Refills


   7/21/11


Oxycodone Hcl/Acetaminophen (Percocet 10/325 Mg Tablet) 10 Mg/325 Mg Tab, 1 TAB 


PO PRN, 0 Refills


   7/21/11


Past Medical/Surgical History


The patient has a past medical and surgical history of hypertension, acid 


reflux, chronic back pain, spinal listhesis, arthritis, hypothyroidism, anxiety,


panic attacks, depression, right knee surgery. Tonsillectomy.


Reviewed Nurses Notes:  Yes


Hx Smoking:  Yes


Smoking Status:  Current: Every Day Smoker


Hx Substance Use Disorder:  No


Hx Alcohol Use:  No


Constitutional





Vital Sign - Last 24 Hours








 12/17/18 12/17/18 12/17/18 12/17/18





 11:05 11:07 11:09 11:15


 


Temp  98.1  


 


Pulse  69  


 


Resp  20  


 


B/P (MAP) 150/106 (121) 150/106 152/93 (112) 145/99 (114)


 


Pulse Ox  92  


 


O2 Delivery  Room Air  


 


    





 12/17/18 12/17/18 12/17/18 12/17/18





 11:30 11:32 11:45 12:00


 


Pulse  65  


 


B/P (MAP) 143/101 (115)  156/105 (122) 159/109 (126)


 


Pulse Ox  93  





 12/17/18   





 12:02   


 


Pulse 62   


 


Pulse Ox 93   








Physical Exam


   General Appearance: The patient is alert, has no immediate need for airway 


protection and no signs of toxicity. 


Eyes: Pupils equal and round no pallor or injection.


ENT, Mouth: Mucous membranes are moist.


Respiratory: There are no retractions, lungs are clear to auscultation.


Cardiovascular: Regular rate and rhythm. 


Gastrointestinal:  Abdomen is soft and non tender, no masses, bowel sounds 


normal.


Neurological: Alert and oriented 4. Moving all extremities. Follow no commands.


No focal neuro deficits.


Skin: Warm and dry, no rashes.


Musculoskeletal:  Neck is supple non tender.


      Extremities are nontender, nonswollen and have full range of motion.





DIFFERENTIAL DIAGNOSIS: After history and physical exam differential diagnosis 


was considered for hypertension, withdrawal symptoms.





Medical Decision Making


ED Course/Re-evaluation


ED Course


The patient was admitted to room. A history and physical were obtained. 


Differential diagnoses were considered. After speaking with the patient, we 


elected to increase the clonidine from 0.1 mg in the morning to 0.2 mg in the 


morning and continue with 0.1 mg at night, I did tell the patient to be cautious


while doing this as it can cause hypotension, patient expressed understanding. 


The patient was also instructed to follow-up with her primary care provider. She


was also given a prescription for Flexeril for her left knee discomfort 


secondary to total knee replacement. The patient had no other questions or 


concerns at this time and was discharged home. Return to ER for any other 


concerns or worsening symptoms.


Decision to Disposition Date:  Dec 17, 2018


Decision to Disposition Time:  12:10





Depart


Departure


Latest Vital Signs





Vital Signs








  Date Time  Temp Pulse Resp B/P (MAP) Pulse Ox O2 Delivery O2 Flow Rate FiO2


 


12/17/18 12:02  62   93   


 


12/17/18 12:00    159/109 (126)    


 


12/17/18 11:07 98.1  20   Room Air  








Impression:  


   Primary Impression:  


   Hypertension


   Additional Impression:  


   Opiate withdrawal


Condition:  Improved


Disposition:  HOME OR SELF-CARE


Referrals:  


OMEGA XIAO DO (PCP)


New Scripts


Cyclobenzaprine Hcl (CYCLOBENZAPRINE HCL) 10 Mg Tablet


5-10 MG PO TID PRN for MUSCLE SPASMS, #9 TAB 0 Refills


   Prov: PAIGE BRONSON-[facility]         12/17/18 


Clonidine Hcl (CLONIDINE HCL) 0.1 Mg Tablet


0.2 MG PO BID, #10 TAB 0 Refills


   Take two of the 0.1mg (0.2mg total) clonidine tablets in the morning


   and 0.1mg at night.


   Prov: PAIGE BRONSON         12/17/18


Patient Instructions:  Hypertension (ED), Opioid Pain Management (ED), Opioid 


Withdrawal (ED)





Additional Instructions:  


Take 2 of the 0.1mg clonidine tablets (0.2mg total), in the morning, and ONLY 


one of the 0.1mg tablets at night, be careful with the changes in medication, it


can cause very low blood pressure, if this happens, please stop taking two in 


the morning and continue taking only one in the morning.


Be sure to drink plenty of water.


Get plenty of rest.


Keep your appointment with Dr. Xiao on Wednesday.


I would consider re-calibrating your blood pressure devise.


Return to the ED for any other concerns or worsening symptoms.





Problem Qualifiers








   Primary Impression:  


   Hypertension


   Hypertension type:  unspecified  Qualified Codes:  I10 - Essential (primary) 


   hypertension








PAIGE BRONSON-[facility]      Dec 17, 2018 11:25